# Patient Record
Sex: MALE | Race: ASIAN | URBAN - METROPOLITAN AREA
[De-identification: names, ages, dates, MRNs, and addresses within clinical notes are randomized per-mention and may not be internally consistent; named-entity substitution may affect disease eponyms.]

---

## 2018-01-18 ENCOUNTER — TELEPHONE (OUTPATIENT)
Dept: DERMATOLOGY | Facility: CLINIC | Age: 38
End: 2018-01-18

## 2018-01-18 NOTE — TELEPHONE ENCOUNTER
----- Message from Marni Landry LPN sent at 1/18/2018  1:38 PM CST -----  Regarding: FW: New Pt - Dr May  Contact: [phone]      ----- Message -----     From: Alicia Hernandez     Sent: 1/18/2018   1:34 PM       To: Derm Triage-Ump  Subject: New Pt - Dr May                               This pt last saw Dr May in 2014. When I told him the first avail is 4.2018, the pt asked me to message Dr May to see if he will bring him in sooner. He didn't give me a lot of details of what his dx is except a f/up of the previous dx.  Please call the pt at [phone] to discuss.    Thanks - Alicia    Please DO NOT send this message and/or reply back to sender.  Call Center Representatives DO NOT respond to messages.

## 2018-04-11 ENCOUNTER — TELEPHONE (OUTPATIENT)
Dept: DERMATOLOGY | Facility: CLINIC | Age: 38
End: 2018-04-11

## 2018-04-17 ENCOUNTER — OFFICE VISIT (OUTPATIENT)
Dept: DERMATOLOGY | Facility: CLINIC | Age: 38
End: 2018-04-17
Payer: COMMERCIAL

## 2018-04-17 DIAGNOSIS — L21.9 DERMATITIS, SEBORRHEIC: Primary | ICD-10-CM

## 2018-04-17 RX ORDER — KETOCONAZOLE 20 MG/G
CREAM TOPICAL 2 TIMES DAILY
Qty: 60 G | Refills: 3 | Status: SHIPPED | OUTPATIENT
Start: 2018-04-17 | End: 2018-07-26

## 2018-04-17 RX ORDER — FLUOCINOLONE ACETONIDE 0.11 MG/ML
OIL TOPICAL
Qty: 1 BOTTLE | Refills: 3 | Status: SHIPPED | OUTPATIENT
Start: 2018-04-17 | End: 2018-09-07

## 2018-04-17 NOTE — MR AVS SNAPSHOT
After Visit Summary   4/17/2018    Alex Villafana    MRN: [id number]           Patient Information     Date Of Birth          1980        Visit Information        Provider Department      4/17/2018 9:30 AM Anmol May MD [facility] Dermatology        Today's Diagnoses     Dermatitis, seborrheic    -  1      Care Instructions    At home, in shower every other day or daily  Try Neutrogena T gel shampoo extra strength          Follow-ups after your visit        Your next 10 appointments already scheduled     Jul 26, 2018 12:15 PM CDT   (Arrive by 12:00 PM)   Return Visit with MD JESENIA [facility] Dermatology ([facility] [place])    [address] [address] [phone]              Who to contact     Please call your clinic at [phone] to:    Ask questions about your health    Make or cancel appointments    Discuss your medicines    Learn about your test results    Speak to your doctor            Additional Information About Your Visit        [employer] Information     [employer] gives you secure access to your electronic health record. If you see a primary care provider, you can also send messages to your care team and make appointments. If you have questions, please call your primary care clinic.  If you do not have a primary care provider, please call [phone] and they will assist you.      [employer] is an electronic gateway that provides easy, online access to your medical records. With [employer], you can request a clinic appointment, read your test results, renew a prescription or communicate with your care team.     To access your existing account, please contact your [facility] or call [phone] for assistance.        Care EveryWhere ID     This is your Care EveryWhere ID. This could be used by other organizations to access your [place] medical records  [id number]         Blood Pressure  from Last 3 Encounters:   12/23/14 137/89   11/19/14 126/79   10/14/14 115/82    Weight from Last 3 Encounters:   12/23/14 78 kg (172 lb)   11/19/14 80.1 kg (176 lb 9.6 oz)   10/14/14 79.4 kg (175 lb)              Today, you had the following     No orders found for display         Today's Medication Changes          These changes are accurate as of 4/17/18 10:29 AM.  If you have any questions, ask your nurse or doctor.               Start taking these medicines.        Dose/Directions    fluocinolone acetonide 0.01 % oil   Used for:  Dermatitis, seborrheic   Started by:  Anmol May MD        Twice a day as needed on scalp and ears   Quantity:  1 Bottle   Refills:  3         These medicines have changed or have updated prescriptions.        Dose/Directions    ketoconazole 2 % cream   Commonly known as:  NIZORAL   This may have changed:  when to take this   Used for:  Dermatitis, seborrheic   Changed by:  Anmol May MD        Apply topically 2 times daily To areas on face   Quantity:  60 g   Refills:  3            Where to get your medicines      These medications were sent to [id number] - [place], [place] - [address]  [address], [place] [address]     Phone:  [phone]     fluocinolone acetonide 0.01 % oil    ketoconazole 2 % cream                Primary Care Provider Office Phone # Fax #    Rajendra Childers [phone]       [facility] [facility] [address]        Equal Access to Services     KOKI DENNIS AH: Hadii marcial heller hadasho Sostephanieali, waaxda luqadaha, qaybta kaalmada adeegyada, waxay mario guerrero. So [facility] [phone].    ATENCIÓN: Si habla meir, tiene a alejandro disposición servicios gratuitos de asistencia lingüística. Llame al [phone].    We comply with applicable federal civil rights laws and [place] laws. We do not discriminate on the basis of race, color, national origin, age, disability, sex, sexual  orientation, or gender identity.            Thank you!     Thank you for choosing [facility] DERMATOLOGY  for your care. Our goal is always to provide you with excellent care. Hearing back from our patients is one way we can continue to improve our services. Please take a few minutes to complete the written survey that you may receive in the mail after your visit with us. Thank you!             Your Updated Medication List - Protect others around you: Learn how to safely use, store and throw away your medicines at www.disposemymeds.org.          This list is accurate as of 4/17/18 10:29 AM.  Always use your most recent med list.                   Brand Name Dispense Instructions for use Diagnosis    desonide 0.05 % cream    DESOWEN    60 g    Apply topically 2 times daily To areas on face    Dermatitis, seborrheic       fluocinolone acetonide 0.01 % oil     1 Bottle    Twice a day as needed on scalp and ears    Dermatitis, seborrheic       Hydrogen Peroxide 1.5 % Soln     236 mL    Take 1 Application by mouth 2 times daily    Tonsil ulcer       ketoconazole 2 % cream    NIZORAL    60 g    Apply topically 2 times daily To areas on face    Dermatitis, seborrheic       omeprazole 20 MG tablet     30 tablet    Take 1 tablet (20 mg) by mouth daily Take 30-60 minutes before a meal.    Tonsil ulcer

## 2018-04-17 NOTE — PROGRESS NOTES
CHIEF COMPLAINT:  Seborrheic dermatitis.      HISTORY OF PRESENT ILLNESS:  Alex is a very pleasant 38-year-old male who I initially met in clinic over 3 years ago.  He was last seen in our clinic on 12/16/2014, at which time he presented with typical findings of seborrheic dermatitis on the head and neck.  At that time we recommended Derma-Smoothe oil for his scalp and ears, desonide cream for his face and Selsun Blue Shampoo.  He reports that he used each of these and had some good improvement, with near resolution with Derma-Smoothe oil as well as T/Gel shampoo, but that he continued to have recurrences.  He is interested in using some of these same treatments again but is wondering if there any options for getting rid of this permanently.  He has no new areas of involvement today.      REVIEW OF SYSTEMS:  No recent fevers or chills.  No nonhealing oral sores.      PHYSICAL EXAMINATION:   GENERAL:  This is a well-appearing, well-nourished male, with a normal mood and affect who is oriented x3.   SKIN:  A cutaneous exam of the head, neck and bilateral upper extremities was performed and demonstrates fine, branny type scaling around the eyebrows and on the scalp, with faint pink erythema.      ASSESSMENT AND PLAN:  Seborrheic dermatitis.  Today's plan is as follows:   1.  We again gave him a prescription for Derma-Smoothe oil to be applied to the scalp and around the ears once to twice daily as needed.   2.  I gave him printed instructions regarding Neutrogena T/Gel Extra Strength shampoo to be used daily or every other day in the shower, as I think he may get better long-term control by using a tar based shampoo.   3.  We also gave him a prescription for ketoconazole 2% cream to be applied once to twice daily to affected areas on the face.   4.  We discussed usual triggers for seborrheic dermatitis and approach to long-term care and that it tends to be a chronic, waxing and waning issue.   5.  He will follow up  in our clinic in 3 months' time.         Anmol May MD  Dermatology Attending

## 2018-04-17 NOTE — NURSING NOTE
"Dermatology Rooming Note    Alex Villafana's goals for this visit include:   Chief Complaint   Patient presents with     Derm Problem     Alex comes to clinic stating \" I have a spot on my face and scalp that is growing.\"     Marni Landry LPN  "

## 2018-04-17 NOTE — LETTER
4/17/2018       RE: Alex Villafana  [address]  [place] [address]     Dear Colleague,    Thank you for referring your patient, Alex Villafana, to the [facility] DERMATOLOGY at [facility]. Please see a copy of my visit note below.    CHIEF COMPLAINT:  Seborrheic dermatitis.      HISTORY OF PRESENT ILLNESS:  Alex is a very pleasant 38-year-old male who I initially met in clinic over 3 years ago.  He was last seen in our clinic on 12/16/2014, at which time he presented with typical findings of seborrheic dermatitis on the head and neck.  At that time we recommended Derma-Smoothe oil for his scalp and ears, desonide cream for his face and Selsun Blue Shampoo.  He reports that he used each of these and had some good improvement, with near resolution with Derma-Smoothe oil as well as T/Gel shampoo, but that he continued to have recurrences.  He is interested in using some of these same treatments again but is wondering if there any options for getting rid of this permanently.  He has no new areas of involvement today.      REVIEW OF SYSTEMS:  No recent fevers or chills.  No nonhealing oral sores.      PHYSICAL EXAMINATION:   GENERAL:  This is a well-appearing, well-nourished male, with a normal mood and affect who is oriented x3.   SKIN:  A cutaneous exam of the head, neck and bilateral upper extremities was performed and demonstrates fine, branny type scaling around the eyebrows and on the scalp, with faint pink erythema.      ASSESSMENT AND PLAN:  Seborrheic dermatitis.  Today's plan is as follows:   1.  We again gave him a prescription for Derma-Smoothe oil to be applied to the scalp and around the ears once to twice daily as needed.   2.  I gave him printed instructions regarding Neutrogena T/Gel Extra Strength shampoo to be used daily or every other day in the shower, as I think he may get better long-term control by using a tar based shampoo.   3.   We also gave him a prescription for ketoconazole 2% cream to be applied once to twice daily to affected areas on the face.   4.  We discussed usual triggers for seborrheic dermatitis and approach to long-term care and that it tends to be a chronic, waxing and waning issue.   5.  He will follow up in our clinic in 3 months' time.         Anmol May MD  Dermatology Attending

## 2018-04-23 PROBLEM — L21.9 DERMATITIS, SEBORRHEIC: Status: ACTIVE | Noted: 2018-04-23

## 2018-07-26 ENCOUNTER — OFFICE VISIT (OUTPATIENT)
Dept: DERMATOLOGY | Facility: CLINIC | Age: 38
End: 2018-07-26
Payer: COMMERCIAL

## 2018-07-26 DIAGNOSIS — D22.9 MULTIPLE BENIGN NEVI: ICD-10-CM

## 2018-07-26 DIAGNOSIS — L21.9 DERMATITIS, SEBORRHEIC: Primary | ICD-10-CM

## 2018-07-26 DIAGNOSIS — D18.01 CHERRY ANGIOMA: ICD-10-CM

## 2018-07-26 RX ORDER — FLUOCINONIDE TOPICAL SOLUTION USP, 0.05% 0.5 MG/ML
SOLUTION TOPICAL DAILY PRN
Qty: 60 ML | Refills: 11 | Status: SHIPPED | OUTPATIENT
Start: 2018-07-26

## 2018-07-26 RX ORDER — KETOCONAZOLE 20 MG/G
CREAM TOPICAL 2 TIMES DAILY
Qty: 60 G | Refills: 3 | Status: SHIPPED | OUTPATIENT
Start: 2018-07-26

## 2018-07-26 ASSESSMENT — PAIN SCALES - GENERAL: PAINLEVEL: NO PAIN (0)

## 2018-07-26 NOTE — PROGRESS NOTES
[facility] Dermatology Note      Dermatology Problem List:  1. Seborrheic dermatitis  - fluocinolone oil to scalp  - Tgel shampoo  - ketoconazole cream to face  2. Nevus of jennifer, left conjunctiva    Encounter Date: Jul 26, 2018    CC:   Chief Complaint   Patient presents with     Derm Problem     Alex is visiting for a 3 month recheck r/t Dermatitis. No change noted.         History of Present Illness:  Mr. Alex Villafana is a 38 year old male who presents as a follow-up for seborrheic dermatitis. He was last seen in our clinic 3 months ago at which time he presented with typical findings of seborrheic dermatitis on the head and neck.  At that time we recommended Derma-Smoothe oil for his scalp and ears, ketoconazole cream for his face and Tgel Shampoo.  He reports that he used each of these and had some good improvement. The ketoconazole works well for his face. He is using the fluocinolone on the anterior scalp 1-2 times per day about 2-3 times per week and tgel shampoo daily. He has trouble clearing this area. He is interested in using some of these same treatments again but is wondering if there any options for getting rid of this permanently.  He has no new areas of involvement today. No other concerns today.       Past Medical History:   Patient Active Problem List   Diagnosis     Possible exposure to blood-borne pathogen     Eczema     Dermatitis, seborrheic     History reviewed. No pertinent past medical history.  History reviewed. No pertinent surgical history.    Medications:  Current Outpatient Prescriptions   Medication Sig Dispense Refill     desonide (DESOWEN) 0.05 % cream Apply topically 2 times daily To areas on face (Patient not taking: Reported on 4/17/2018) 60 g 3     fluocinolone acetonide 0.01 % oil Twice a day as needed on scalp and ears 1 Bottle 3     Hydrogen Peroxide 1.5 % SOLN Take 1 Application by mouth 2 times daily (Patient not taking: Reported on 4/17/2018)  236 mL 2     ketoconazole (NIZORAL) 2 % cream Apply topically 2 times daily To areas on face 60 g 3     omeprazole 20 MG tablet Take 1 tablet (20 mg) by mouth daily Take 30-60 minutes before a meal. (Patient not taking: Reported on 4/17/2018) 30 tablet 0        Allergies   Allergen Reactions     No Known Allergies          Review of Systems:  -As per HPI  -Constitutional: The patient is otherwise feeling well  -Skin: As above in HPI. No additional skin concerns.    Physical exam:  Vitals: There were no vitals taken for this visit.  GEN: This is a well developed, well-nourished male in no acute distress, in a pleasant mood.    SKIN: Focused examination of the scalp, face, ears, neck, chest, upper extremities was performed.  -Jones skin type V  - no erythema or scale of the glabella or nasolabial folds  - thin pink plaques on the anterior scalp with flaky white scale  - 2 mm purple papule on the right dorsal wrist  - few dark brown to black macules on the upper arms  - medium brown macule on the left lateral conjunctiva  -No other lesions of concern on areas examined.     Impression/Plan:  1. Seborrheic dermatitis    Continue ketoconazole cream to the face 1-2 times daily    Continue t/Gel Extra strength shampoo    Increase topical steroid to fluocinonide solution 0.05% to the scalp--advised he keep this off of the face     2. Cherry angioma(s)    No further intervention required. Patient to report changes.     3. Multiple benign nevi, nevus of jennifer    Discussed benign appearance of his nevi as well as the diagnosis of nevus of jennifer      Follow-up in 4 months, earlier for new or changing lesions.        staffed the patient.    Staff Involved:  Resident(Vicki Flores)/Staff(as above)     I have seen and examined this patient and agree with the assessment and plan as documented in the resident's note.    Anmol May MD  Dermatology Attending

## 2018-07-26 NOTE — PATIENT INSTRUCTIONS
Continue ketoconazole on the face. Continue T Gel shampoo--massage this into the scalp and leave in place for a few minutes prior to rinsing.  Use fluocinonide 0.05% solution to the scalp.     Dandruff Shampoos    What do I use dandruff shampoos for?    Flaking    Redness    Itching    How and when do I use these shampoos?    Rub gently into scalp.    Leave on for at least five minutes before rinsing.    You will feel better with daily use.    As redness, flaking, and itching get better you can use the shampoo less    Using two shampoos with different active elements may work best. Switch shampoos each week.    What shampoos can I buy?  Below is a list of active elements that help with itching, redness, and flaking. Name brand shampoos having these elements are included.  Non-brand shampoos that have these active elements can also be used.     Selenium Sulfide  o Blue Head and Shoulders (1% selenium sulfide)  o Selsun Blue (1% selenium sulfide)  o Selsun Gold (2.5% selenium sulfide, prescription needed)    Salicylic Acid  o Neutrogena T/Sal  (3% salicylic acid)  o Sebulex  (2% sulfur, 2% salicylic acid)  o Ionil  (2% salicylic acid)    Pyrithione Zinc  o White Head and Shoulders (1% pyrithione zinc)  o DHS Zinc Shampoo (1% pyrithione zinc)    Ketoconazole  o Nizoral  (1% ketoconazole)    Tar (Note: tar may turn white/gray hair yellow if used often)  o Neutrogena T/Gel (0.5% coal tar)  o Neutrogena T/Gel extra strength (1% coal tar)  o DHS Tar Shampoo (0.5% coal tar)

## 2018-07-26 NOTE — LETTER
7/26/2018       RE: Alex Villafana  [address]  [place] [address]     Dear Colleague,    Thank you for referring your patient, Alex Villafana, to [facility]. Please see a copy of my visit note below.    [facility] Dermatology Note    Dermatology Problem List:  1. Seborrheic dermatitis  - fluocinolone oil to scalp  - Tgel shampoo  - ketoconazole cream to face  2. Nevus of jennifer, left conjunctiva    Encounter Date: Jul 26, 2018    CC:   Chief Complaint   Patient presents with     Derm Problem     Alex is visiting for a 3 month recheck r/t Dermatitis. No change noted.     History of Present Illness:  Mr. Alex Villafana is a 38 year old male who presents as a follow-up for seborrheic dermatitis. He was last seen in our clinic 3 months ago at which time he presented with typical findings of seborrheic dermatitis on the head and neck.  At that time we recommended Derma-Smoothe oil for his scalp and ears, ketoconazole cream for his face and Tgel Shampoo.  He reports that he used each of these and had some good improvement. The ketoconazole works well for his face. He is using the fluocinolone on the anterior scalp 1-2 times per day about 2-3 times per week and tgel shampoo daily. He has trouble clearing this area. He is interested in using some of these same treatments again but is wondering if there any options for getting rid of this permanently.  He has no new areas of involvement today. No other concerns today.     Past Medical History:   Patient Active Problem List   Diagnosis     Possible exposure to blood-borne pathogen     Eczema     Dermatitis, seborrheic     History reviewed. No pertinent past medical history.  History reviewed. No pertinent surgical history.    Medications:  Current Outpatient Prescriptions   Medication Sig Dispense Refill     desonide (DESOWEN) 0.05 % cream Apply  topically 2 times daily To areas on face (Patient not taking: Reported on 4/17/2018) 60 g 3     fluocinolone acetonide 0.01 % oil Twice a day as needed on scalp and ears 1 Bottle 3     Hydrogen Peroxide 1.5 % SOLN Take 1 Application by mouth 2 times daily (Patient not taking: Reported on 4/17/2018) 236 mL 2     ketoconazole (NIZORAL) 2 % cream Apply topically 2 times daily To areas on face 60 g 3     omeprazole 20 MG tablet Take 1 tablet (20 mg) by mouth daily Take 30-60 minutes before a meal. (Patient not taking: Reported on 4/17/2018) 30 tablet 0        Allergies   Allergen Reactions     No Known Allergies      Review of Systems:  -As per HPI  -Constitutional: The patient is otherwise feeling well  -Skin: As above in HPI. No additional skin concerns.    Physical exam:  Vitals: There were no vitals taken for this visit.  GEN: This is a well developed, well-nourished male in no acute distress, in a pleasant mood.    SKIN: Focused examination of the scalp, face, ears, neck, chest, upper extremities was performed.  -Jones skin type V  - no erythema or scale of the glabella or nasolabial folds  - thin pink plaques on the anterior scalp with flaky white scale  - 2 mm purple papule on the right dorsal wrist  - few dark brown to black macules on the upper arms  - medium brown macule on the left lateral conjunctiva  -No other lesions of concern on areas examined.     Impression/Plan:  1. Seborrheic dermatitis    Continue ketoconazole cream to the face 1-2 times daily    Continue t/Gel Extra strength shampoo    Increase topical steroid to fluocinonide solution 0.05% to the scalp--advised he keep this off of the face     2. Cherry angioma(s)    No further intervention required. Patient to report changes.     3. Multiple benign nevi, nevus of jennifer    Discussed benign appearance of his nevi as well as the diagnosis of nevus of jennifer    Follow-up in 4 months, earlier for new or changing lesions.      staffed the  patient.    Staff Involved:  Resident(Vicki Flores)/Staff(as above)    I have seen and examined this patient and agree with the assessment and plan as documented in the resident's note.    Again, thank you for allowing me to participate in the care of your patient.      Sincerely,    Anmol May MD

## 2018-07-26 NOTE — NURSING NOTE
Dermatology Rooming Note    Alex Villafana's goals for this visit include:   Chief Complaint   Patient presents with     Derm Problem     Alex is visiting for a 3 month recheck r/t Dermatitis. No change noted.     Chrissy Reyna LPN

## 2018-07-26 NOTE — MR AVS SNAPSHOT
After Visit Summary   7/26/2018    Alex Villafana    MRN: [id number]           Patient Information     Date Of Birth          1980        Visit Information        Provider Department      7/26/2018 12:15 PM Anmol May MD [facility]        Today's Diagnoses     Dermatitis, seborrheic    -  1      Care Instructions    Continue ketoconazole on the face. Continue T Gel shampoo--massage this into the scalp and leave in place for a few minutes prior to rinsing.  Use fluocinonide 0.05% solution to the scalp.     Dandruff Shampoos    What do I use dandruff shampoos for?    Flaking    Redness    Itching    How and when do I use these shampoos?    Rub gently into scalp.    Leave on for at least five minutes before rinsing.    You will feel better with daily use.    As redness, flaking, and itching get better you can use the shampoo less    Using two shampoos with different active elements may work best. Switch shampoos each week.    What shampoos can I buy?  Below is a list of active elements that help with itching, redness, and flaking. Name brand shampoos having these elements are included.  Non-brand shampoos that have these active elements can also be used.     Selenium Sulfide  o Blue Head and Shoulders (1% selenium sulfide)  o Selsun Blue (1% selenium sulfide)  o Selsun Gold (2.5% selenium sulfide, prescription needed)    Salicylic Acid  o Neutrogena T/Sal  (3% salicylic acid)  o Sebulex  (2% sulfur, 2% salicylic acid)  o Ionil  (2% salicylic acid)    Pyrithione Zinc  o White Head and Shoulders (1% pyrithione zinc)  o DHS Zinc Shampoo (1% pyrithione zinc)    Ketoconazole  o Nizoral  (1% ketoconazole)    Tar (Note: tar may turn white/gray hair yellow if used often)  o Neutrogena T/Gel (0.5% coal tar)  o Neutrogena T/Gel extra strength (1% coal tar)  o DHS Tar Shampoo (0.5% coal tar)              Follow-ups after your visit        Follow-up notes from your care team     Return in  about 4 months (around 11/26/2018).      Who to contact     Please call your clinic at [phone] to:    Ask questions about your health    Make or cancel appointments    Discuss your medicines    Learn about your test results    Speak to your doctor            Additional Information About Your Visit        [employer] Information     [employer]Stream gives you secure access to your electronic health record. If you see a primary care provider, you can also send messages to your care team and make appointments. If you have questions, please call your primary care clinic.  If you do not have a primary care provider, please call [phone] and they will assist you.      [employer] is an electronic gateway that provides easy, online access to your medical records. With [employer]Stream, you can request a clinic appointment, read your test results, renew a prescription or communicate with your care team.     To access your existing account, please contact your [facility] or call [phone] for assistance.        Care EveryWhere ID     This is your Care EveryWhere ID. This could be used by other organizations to access your [place] medical records  [id number]         Blood Pressure from Last 3 Encounters:   12/23/14 137/89   11/19/14 126/79   10/14/14 115/82    Weight from Last 3 Encounters:   12/23/14 78 kg (172 lb)   11/19/14 80.1 kg (176 lb 9.6 oz)   10/14/14 79.4 kg (175 lb)              Today, you had the following     No orders found for display       Primary Care Provider Office Phone # Fax #    Rajendra Childers [phone]       [address] [place] [id number]        Equal Access to Services     [facility] AH: Hadii aad ku hadasho Soomaali, waaxda luqadaha, qaybta kaalmada adeegyada, shari rodriguez . So [facility] [phone].    ATENCIÓN: Si habla español, tiene a alejandro disposición servicios gratuitos de asistencia lingüística. Llame al  [phone].    We comply with applicable federal civil rights laws and [place] laws. We do not discriminate on the basis of race, color, national origin, age, disability, sex, sexual orientation, or gender identity.            Thank you!     Thank you for choosing [facility]  for your care. Our goal is always to provide you with excellent care. Hearing back from our patients is one way we can continue to improve our services. Please take a few minutes to complete the written survey that you may receive in the mail after your visit with us. Thank you!             Your Updated Medication List - Protect others around you: Learn how to safely use, store and throw away your medicines at www.disposemymeds.org.          This list is accurate as of 7/26/18 12:54 PM.  Always use your most recent med list.                   Brand Name Dispense Instructions for use Diagnosis    desonide 0.05 % cream    DESOWEN    60 g    Apply topically 2 times daily To areas on face    Dermatitis, seborrheic       fluocinolone acetonide 0.01 % oil     1 Bottle    Twice a day as needed on scalp and ears    Dermatitis, seborrheic       Hydrogen Peroxide 1.5 % Soln     236 mL    Take 1 Application by mouth 2 times daily    Tonsil ulcer       ketoconazole 2 % cream    NIZORAL    60 g    Apply topically 2 times daily To areas on face    Dermatitis, seborrheic       omeprazole 20 MG tablet     30 tablet    Take 1 tablet (20 mg) by mouth daily Take 30-60 minutes before a meal.    Tonsil ulcer

## 2018-09-07 ENCOUNTER — OFFICE VISIT (OUTPATIENT)
Dept: FAMILY MEDICINE | Facility: CLINIC | Age: 38
End: 2018-09-07
Payer: COMMERCIAL

## 2018-09-07 VITALS
WEIGHT: 184.6 LBS | HEART RATE: 96 BPM | OXYGEN SATURATION: 100 % | BODY MASS INDEX: 27.34 KG/M2 | HEIGHT: 69 IN | DIASTOLIC BLOOD PRESSURE: 86 MMHG | TEMPERATURE: 97.8 F | RESPIRATION RATE: 16 BRPM | SYSTOLIC BLOOD PRESSURE: 125 MMHG

## 2018-09-07 DIAGNOSIS — J01.90 ACUTE BACTERIAL SINUSITIS: ICD-10-CM

## 2018-09-07 DIAGNOSIS — Z00.00 HEALTHCARE MAINTENANCE: Primary | ICD-10-CM

## 2018-09-07 DIAGNOSIS — J30.2 SEASONAL ALLERGIC RHINITIS, UNSPECIFIED CHRONICITY, UNSPECIFIED TRIGGER: ICD-10-CM

## 2018-09-07 DIAGNOSIS — B96.89 ACUTE BACTERIAL SINUSITIS: ICD-10-CM

## 2018-09-07 RX ORDER — ECHINACEA PURPUREA EXTRACT 125 MG
1 TABLET ORAL DAILY PRN
Qty: 1 BOTTLE | Refills: 3 | Status: SHIPPED | OUTPATIENT
Start: 2018-09-07

## 2018-09-07 RX ORDER — CETIRIZINE HYDROCHLORIDE 10 MG/1
10 TABLET ORAL EVERY EVENING
Qty: 30 TABLET | Refills: 1 | Status: SHIPPED | OUTPATIENT
Start: 2018-09-07

## 2018-09-07 RX ORDER — FLUTICASONE PROPIONATE 50 MCG
1 SPRAY, SUSPENSION (ML) NASAL DAILY
Qty: 1 BOTTLE | Refills: 3 | Status: SHIPPED | OUTPATIENT
Start: 2018-09-07

## 2018-09-07 RX ORDER — ACETAMINOPHEN 325 MG/1
650 TABLET ORAL EVERY 6 HOURS PRN
Qty: 100 TABLET | Refills: 1 | Status: SHIPPED | OUTPATIENT
Start: 2018-09-07

## 2018-09-07 ASSESSMENT — ENCOUNTER SYMPTOMS
DIZZINESS: 0
DYSURIA: 0
SINUS PRESSURE: 1
FEVER: 0
HEADACHES: 1
WEAKNESS: 0
SHORTNESS OF BREATH: 0
FATIGUE: 0
SINUS PAIN: 1
FACIAL SWELLING: 0
ABDOMINAL PAIN: 0
EYE ITCHING: 1
COUGH: 0
SORE THROAT: 1
CHILLS: 0
EYE REDNESS: 1
EYE DISCHARGE: 0

## 2018-09-07 NOTE — PATIENT INSTRUCTIONS
Here is the plan from today's visit    1. Healthcare maintenance  - ADMIN VACCINE, INITIAL  - TDAP VACCINE (BOOSTRIX)    2. Seasonal allergic rhinitis, unspecified chronicity, unspecified trigger  - fluticasone (FLONASE) 50 MCG/ACT spray; Spray 1 spray into both nostrils daily  Dispense: 1 Bottle; Refill: 3  - cetirizine (ZYRTEC) 10 MG tablet; Take 1 tablet (10 mg) by mouth every evening  Dispense: 30 tablet; Refill: 1  - Dextran 70-Hypromellose, PF, (ARTIFICIAL TEARS PF) 0.1-0.3 % SOLN; Apply 1 drop to eye 3 times daily as needed  Dispense: 1 each; Refill: 1  - acetaminophen (TYLENOL) 325 MG tablet; Take 2 tablets (650 mg) by mouth every 6 hours as needed for mild pain  Dispense: 100 tablet; Refill: 1  - sodium chloride (AFRIN SALINE NASAL MIST) 0.65 % nasal spray; Spray 1 spray into both nostrils daily as needed for congestion  Dispense: 1 Bottle; Refill: 3    3. Acute bacterial sinusitis  Please take paper script and fill if needed in 3-4 days from now if you continue to feel poorly and are not getting better with allergy medication listed above.   - amoxicillin-clavulanate (AUGMENTIN) 875-125 MG per tablet; Take 1 tablet by mouth 2 times daily  Dispense: 14 tablet; Refill: 0    Please call or return to clinic if your symptoms don't go away.    Follow up plan  Follow up as needed     Thank you for coming to [facility] today.  Lab Testing:  **If you had lab testing today and your results are reassuring or normal they will be mailed to you or sent through Ch[employer] within 7 days.   **If the lab tests need quick action we will call you with the results.  The phone number we will call with results is # [phone] (home) . If this is not the best number please call our clinic and change the number.  Medication Refills:  If you need any refills please call your pharmacy and they will contact us.   If you need to  your refill at a new pharmacy, please contact the new pharmacy directly. The new pharmacy will  help you get your medications transferred faster.   Scheduling:  If you have any concerns about today's visit or wish to schedule another appointment please call our office during normal business hours [phone] (8-5:00 M-F)  If a referral was made to a [facility] and you don't get a call from central scheduling please call [phone].  If a Mammogram was ordered for you at [facility] call [phone] to schedule or change your appointment.  If you had an XRay/CT/Ultrasound/MRI ordered the number is [phone] to schedule or change your radiology appointment.   Medical Concerns:  If you have urgent medical concerns please call [phone] at any time of the day.    Lazaro Velazquez MD

## 2018-09-07 NOTE — MR AVS SNAPSHOT
After Visit Summary   9/7/2018    Alex Villafana    MRN: [id number]           Patient Information     Date Of Birth          1980        Visit Information        Provider Department      9/7/2018 4:00 PM [facility]        Today's Diagnoses     Healthcare maintenance    -  1    Seasonal allergic rhinitis, unspecified chronicity, unspecified trigger        Acute bacterial sinusitis          Care Instructions    Here is the plan from today's visit    1. Healthcare maintenance  - ADMIN VACCINE, INITIAL  - TDAP VACCINE (BOOSTRIX)    2. Seasonal allergic rhinitis, unspecified chronicity, unspecified trigger  - fluticasone (FLONASE) 50 MCG/ACT spray; Spray 1 spray into both nostrils daily  Dispense: 1 Bottle; Refill: 3  - cetirizine (ZYRTEC) 10 MG tablet; Take 1 tablet (10 mg) by mouth every evening  Dispense: 30 tablet; Refill: 1  - Dextran 70-Hypromellose, PF, (ARTIFICIAL TEARS PF) 0.1-0.3 % SOLN; Apply 1 drop to eye 3 times daily as needed  Dispense: 1 each; Refill: 1  - acetaminophen (TYLENOL) 325 MG tablet; Take 2 tablets (650 mg) by mouth every 6 hours as needed for mild pain  Dispense: 100 tablet; Refill: 1  - sodium chloride (AFRIN SALINE NASAL MIST) 0.65 % nasal spray; Spray 1 spray into both nostrils daily as needed for congestion  Dispense: 1 Bottle; Refill: 3    3. Acute bacterial sinusitis  Please take paper script and fill if needed in 3-4 days from now if you continue to feel poorly and are not getting better with allergy medication listed above.   - amoxicillin-clavulanate (AUGMENTIN) 875-125 MG per tablet; Take 1 tablet by mouth 2 times daily  Dispense: 14 tablet; Refill: 0    Please call or return to clinic if your symptoms don't go away.    Follow up plan  Follow up as needed     Thank you for coming to [facility] today.  Lab Testing:  **If you had lab testing today and your results are reassuring or normal they will be mailed to  you or sent through iS[employer]a within 7 days.   **If the lab tests need quick action we will call you with the results.  The phone number we will call with results is # [phone] (home) . If this is not the best number please call our clinic and change the number.  Medication Refills:  If you need any refills please call your pharmacy and they will contact us.   If you need to  your refill at a new pharmacy, please contact the new pharmacy directly. The new pharmacy will help you get your medications transferred faster.   Scheduling:  If you have any concerns about today's visit or wish to schedule another appointment please call our office during normal business hours [phone] (8-5:00 M-F)  If a referral was made to a [facility] and you don't get a call from central scheduling please call [phone].  If a Mammogram was ordered for you at [facility] call [phone] to schedule or change your appointment.  If you had an XRay/CT/Ultrasound/MRI ordered the number is [phone] to schedule or change your radiology appointment.   Medical Concerns:  If you have urgent medical concerns please call [phone] at any time of the day.    Lazaro Velazquez MD            Follow-ups after your visit        Follow-up notes from your care team     Return if symptoms worsen or fail to improve.      Who to contact     Please call your clinic at [phone] to:    Ask questions about your health    Make or cancel appointments    Discuss your medicines    Learn about your test results    Speak to your doctor            Additional Information About Your Visit        [employer] Information     [employer] gives you secure access to your electronic health record. If you see a primary care provider, you can also send messages to your care team and make appointments. If you have questions, please call your primary care clinic.  If you do not have a primary care provider, please call [phone] and  "they will assist you.      [employer] is an electronic gateway that provides easy, online access to your medical records. With [employer], you can request a clinic appointment, read your test results, renew a prescription or communicate with your care team.     To access your existing account, please contact your [facility] or call [phone] for assistance.        Care EveryWhere ID     This is your Care EveryWhere ID. This could be used by other organizations to access your [place] medical records  [id number]        Your Vitals Were     Pulse Temperature Respirations Height Pulse Oximetry BMI (Body Mass Index)    96 97.8  F (36.6  C) (Oral) 16 5' 8.86\" (174.9 cm) 100% 27.37 kg/m2       Blood Pressure from Last 3 Encounters:   09/07/18 125/86   12/23/14 137/89   11/19/14 126/79    Weight from Last 3 Encounters:   09/07/18 184 lb 9.6 oz (83.7 kg)   12/23/14 172 lb (78 kg)   11/19/14 176 lb 9.6 oz (80.1 kg)              We Performed the Following     ADMIN VACCINE, INITIAL     TDAP VACCINE (BOOSTRIX)          Today's Medication Changes          These changes are accurate as of 9/7/18 11:59 PM.  If you have any questions, ask your nurse or doctor.               Start taking these medicines.        Dose/Directions    acetaminophen 325 MG tablet   Commonly known as:  TYLENOL   Used for:  Seasonal allergic rhinitis, unspecified chronicity, unspecified trigger   Started by:  Lazaro Velazquez        Dose:  650 mg   Take 2 tablets (650 mg) by mouth every 6 hours as needed for mild pain   Quantity:  100 tablet   Refills:  1       amoxicillin-clavulanate 875-125 MG per tablet   Commonly known as:  AUGMENTIN   Used for:  Acute bacterial sinusitis   Started by:  Lazaro Velazquez        Dose:  1 tablet   Take 1 tablet by mouth 2 times daily   Quantity:  14 tablet   Refills:  0       cetirizine 10 MG tablet   Commonly known as:  zyrTEC   Used for:  Seasonal allergic rhinitis, unspecified " chronicity, unspecified trigger   Started by:  Lazaro Velazquez        Dose:  10 mg   Take 1 tablet (10 mg) by mouth every evening   Quantity:  30 tablet   Refills:  1       Dextran 70-Hypromellose (PF) 0.1-0.3 % Soln   Commonly known as:  ARTIFICIAL TEARS PF   Used for:  Seasonal allergic rhinitis, unspecified chronicity, unspecified trigger   Started by:  Lazaro Velazquez        Dose:  1 drop   Apply 1 drop to eye 3 times daily as needed   Quantity:  1 each   Refills:  1       fluticasone 50 MCG/ACT spray   Commonly known as:  FLONASE   Used for:  Seasonal allergic rhinitis, unspecified chronicity, unspecified trigger   Started by:  Lazaro Velazquez        Dose:  1 spray   Spray 1 spray into both nostrils daily   Quantity:  1 Bottle   Refills:  3       sodium chloride 0.65 % nasal spray   Commonly known as:  AFRIN SALINE NASAL MIST   Used for:  Seasonal allergic rhinitis, unspecified chronicity, unspecified trigger   Started by:  Lazaro Velazquez        Dose:  1 spray   Spray 1 spray into both nostrils daily as needed for congestion   Quantity:  1 Bottle   Refills:  3         Stop taking these medicines if you haven't already. Please contact your care team if you have questions.     desonide 0.05 % cream   Commonly known as:  DESOWEN   Stopped by:  Lazaro Velazquez           fluocinolone acetonide 0.01 % oil   Stopped by:  Lazaro Velazquez           Hydrogen Peroxide 1.5 % Soln   Stopped by:  Lazaro Velazquez           omeprazole 20 MG tablet   Stopped by:  Lazaro Velazquez                Where to get your medicines      These medications were sent to [id number] - [place], [place] - [address]   [address] [place] [facility] [address]     Phone:  [phone]     acetaminophen 325 MG tablet    cetirizine 10 MG tablet    Dextran 70-Hypromellose (PF) 0.1-0.3 % Soln    fluticasone 50 MCG/ACT spray    sodium chloride 0.65 % nasal spray         Some of these  will need a paper prescription and others can be bought over the counter.  Ask your nurse if you have questions.     Bring a paper prescription for each of these medications     amoxicillin-clavulanate 875-125 MG per tablet                Primary Care Provider Office Phone #    Pranav Tirado [phone]       [facility] [address] [place] [address]        Equal Access to Services     [facility] : Hadii aad ku hadasho Soomaali, waaxda luqadaha, qaybta kaalmada adeegyada, waxay idiin hayaan adeeg kharash la'aan ah. So [facility] [phone].    ATENCIÓN: Si habla español, tiene a alejandro disposición servicios gratuitos de asistencia lingüística. [facility] [phone].    We comply with applicable federal civil rights laws and [place] laws. We do not discriminate on the basis of race, color, national origin, age, disability, sex, sexual orientation, or gender identity.            Thank you!     Thank you for choosing [facility]  for your care. Our goal is always to provide you with excellent care. Hearing back from our patients is one way we can continue to improve our services. Please take a few minutes to complete the written survey that you may receive in the mail after your visit with us. Thank you!             Your Updated Medication List - Protect others around you: Learn how to safely use, store and throw away your medicines at www.disposemymeds.org.          This list is accurate as of 9/7/18 11:59 PM.  Always use your most recent med list.                   Brand Name Dispense Instructions for use Diagnosis    acetaminophen 325 MG tablet    TYLENOL    100 tablet    Take 2 tablets (650 mg) by mouth every 6 hours as needed for mild pain    Seasonal allergic rhinitis, unspecified chronicity, unspecified trigger       amoxicillin-clavulanate 875-125 MG per tablet    AUGMENTIN    14 tablet    Take 1 tablet by mouth 2 times daily    Acute bacterial sinusitis        cetirizine 10 MG tablet    zyrTEC    30 tablet    Take 1 tablet (10 mg) by mouth every evening    Seasonal allergic rhinitis, unspecified chronicity, unspecified trigger       Dextran 70-Hypromellose (PF) 0.1-0.3 % Soln    ARTIFICIAL TEARS PF    1 each    Apply 1 drop to eye 3 times daily as needed    Seasonal allergic rhinitis, unspecified chronicity, unspecified trigger       fluocinonide 0.05 % solution    LIDEX    60 mL    Apply topically daily as needed    Dermatitis, seborrheic       fluticasone 50 MCG/ACT spray    FLONASE    1 Bottle    Spray 1 spray into both nostrils daily    Seasonal allergic rhinitis, unspecified chronicity, unspecified trigger       ketoconazole 2 % cream    NIZORAL    60 g    Apply topically 2 times daily To areas on face    Dermatitis, seborrheic       sodium chloride 0.65 % nasal spray    AFRIN SALINE NASAL MIST    1 Bottle    Spray 1 spray into both nostrils daily as needed for congestion    Seasonal allergic rhinitis, unspecified chronicity, unspecified trigger

## 2018-09-07 NOTE — PROGRESS NOTES
"       HPI       Alex Villafana is a 38 year old  who presents for   Chief Complaint   Patient presents with     Headache     x couple of days     Nasal Congestion     x 2 months     Complaining of sneezing, eye itchiness, sore throat, nose pain/congestion and headache for the past 2 months. Mucus is yellow/green. Pain is described as achy that is mainly a headache and some nasal pain. The pain does not radiate. Pain has worsened recently which worried him. This pain has happened previously in 2017 for about 2 months and spontaneously went away without any intervention. Not taking any medications. Not tried any medications to help the pain. Exacerbated by night and lying down in bed. Walking outside makes it worse. He has no known allergies.    No other concerns to discuss today.     Problem, Medication and Allergy Lists were reviewed and updated if needed..    He has a history of eczema.     Patient is a new patient to this clinic and will establish care at next visit as this is an acute visit.     Family history: No family history of allergies.          Review of Systems:   Review of Systems   Constitutional: Negative for chills, fatigue and fever.   HENT: Positive for congestion, ear pain, postnasal drip, sinus pain, sinus pressure, sneezing and sore throat. Negative for facial swelling.    Eyes: Positive for redness and itching. Negative for discharge.   Respiratory: Negative for cough and shortness of breath.    Cardiovascular: Negative for chest pain.   Gastrointestinal: Negative for abdominal pain.   Genitourinary: Negative for dysuria.   Neurological: Positive for headaches. Negative for dizziness and weakness.          Physical Exam:     Vitals:    09/07/18 1551   BP: 125/86   Pulse: 96   Resp: 16   Temp: 97.8  F (36.6  C)   TempSrc: Oral   SpO2: 100%   Weight: 184 lb 9.6 oz (83.7 kg)   Height: 5' 8.86\" (174.9 cm)     Body mass index is 27.37 kg/(m^2).  Vitals were reviewed and were normal   "   Physical Exam   Constitutional: He appears well-developed and well-nourished. No distress.   HENT:   Head: Normocephalic and atraumatic.   Right Ear: Tympanic membrane and external ear normal. No drainage, swelling or tenderness.   Left Ear: Tympanic membrane and external ear normal. No drainage, swelling or tenderness.   Nose: Mucosal edema (erythema) present. No rhinorrhea or sinus tenderness. Right sinus exhibits no maxillary sinus tenderness and no frontal sinus tenderness. Left sinus exhibits no maxillary sinus tenderness and no frontal sinus tenderness.   Mouth/Throat: Posterior oropharyngeal erythema present. No oropharyngeal exudate or posterior oropharyngeal edema.   Eyes: Conjunctivae and EOM are normal. Pupils are equal, round, and reactive to light. Right eye exhibits no discharge. Left eye exhibits no discharge.   Cardiovascular: Normal rate, regular rhythm and normal heart sounds.    No murmur heard.  Pulmonary/Chest: Effort normal and breath sounds normal. He has no wheezes.   Abdominal: Soft. Bowel sounds are normal.   Neurological: He is alert.   Skin: Skin is warm and dry. No rash noted. He is not diaphoretic.   Psychiatric: He has a normal mood and affect. His behavior is normal.   Vitals reviewed.      Results:   No testing ordered today    Assessment and Plan      Alex was seen today for headache and nasal congestion with allergic rhinitis.     Diagnoses and all orders for this visit:    Healthcare maintenance  -     ADMIN VACCINE, INITIAL  -     TDAP VACCINE (BOOSTRIX)    Seasonal allergic rhinitis, unspecified chronicity, unspecified trigger  -     fluticasone (FLONASE) 50 MCG/ACT spray; Spray 1 spray into both nostrils daily  -     cetirizine (ZYRTEC) 10 MG tablet; Take 1 tablet (10 mg) by mouth every evening  -     Dextran 70-Hypromellose, PF, (ARTIFICIAL TEARS PF) 0.1-0.3 % SOLN; Apply 1 drop to eye 3 times daily as needed  -     acetaminophen (TYLENOL) 325 MG tablet; Take 2 tablets  (650 mg) by mouth every 6 hours as needed for mild pain  -     sodium chloride (AFRIN SALINE NASAL MIST) 0.65 % nasal spray; Spray 1 spray into both nostrils daily as needed for congestion    Acute bacterial sinusitis  I do not think that patient has acute bacterial sinusitis at this moment, but I prescribed him antibiotics in case he does not improve with allergic rhinitis treatments as listed above. He will take the script to his pharmacy if he does not feel better in the next 3-5 days.   -     amoxicillin-clavulanate (AUGMENTIN) 875-125 MG per tablet; Take 1 tablet by mouth 2 times daily       Medications Discontinued During This Encounter   Medication Reason     desonide (DESOWEN) 0.05 % cream      omeprazole 20 MG tablet      Hydrogen Peroxide 1.5 % SOLN      fluocinolone acetonide 0.01 % oil      Options for treatment and follow-up care were reviewed with the patient. Alex Villafana  engaged in the decision making process and verbalized understanding of the options discussed and agreed with the final plan.    Lazaro Velazquez

## 2018-09-07 NOTE — PROGRESS NOTES
Preceptor Attestation:   Patient seen, evaluated and discussed with the resident. I have verified the content of the note, which accurately reflects my assessment of the patient and the plan of care.   Supervising Physician:  Rosa Sawyer MD

## 2018-12-27 ENCOUNTER — TELEPHONE (OUTPATIENT)
Dept: DERMATOLOGY | Facility: CLINIC | Age: 38
End: 2018-12-27

## 2018-12-27 NOTE — TELEPHONE ENCOUNTER
[facility] Call Center    Phone Message    May a detailed message be left on voicemail: yes    Reason for Call: Other: Pt believes he had an appt scheduled in December that has disappeared.  Pt was supposed to see Dr May every 3 mos but because of disappearing appt will not be able to see Dr May until mid-Feb.  Pt requests staff call him and assist him to find an appt closer to Dr Sol 3 mos. planned visit.  Please call Pt,  Pt also requests a [employer] msg      Action Taken: Message routed to:  [facility] ([facility]): Dermatology

## 2018-12-27 NOTE — TELEPHONE ENCOUNTER
Patient declined the opening on Jan 8th. He would like to speak with staff regarding medications.

## 2018-12-27 NOTE — TELEPHONE ENCOUNTER
Alex is requesting a refill of his medications. I informed him to contact his pharmacy - medications were sent with refills.    Alex declined scheduling an appointment.

## 2020-03-02 ENCOUNTER — HEALTH MAINTENANCE LETTER (OUTPATIENT)
Age: 40
End: 2020-03-02

## 2020-12-20 ENCOUNTER — HEALTH MAINTENANCE LETTER (OUTPATIENT)
Age: 40
End: 2020-12-20

## 2021-04-24 ENCOUNTER — HEALTH MAINTENANCE LETTER (OUTPATIENT)
Age: 41
End: 2021-04-24

## 2021-10-03 ENCOUNTER — HEALTH MAINTENANCE LETTER (OUTPATIENT)
Age: 41
End: 2021-10-03

## 2022-05-15 ENCOUNTER — HEALTH MAINTENANCE LETTER (OUTPATIENT)
Age: 42
End: 2022-05-15

## 2022-09-10 ENCOUNTER — HEALTH MAINTENANCE LETTER (OUTPATIENT)
Age: 42
End: 2022-09-10

## 2023-06-03 ENCOUNTER — HEALTH MAINTENANCE LETTER (OUTPATIENT)
Age: 43
End: 2023-06-03